# Patient Record
Sex: FEMALE
[De-identification: names, ages, dates, MRNs, and addresses within clinical notes are randomized per-mention and may not be internally consistent; named-entity substitution may affect disease eponyms.]

---

## 2017-06-13 ENCOUNTER — HOSPITAL (OUTPATIENT)
Dept: OTHER | Age: 72
End: 2017-06-13
Attending: INTERNAL MEDICINE

## 2017-06-13 LAB
ANALYZER ANC (IANC): ABNORMAL
ANION GAP SERPL CALC-SCNC: 12 MMOL/L (ref 10–20)
BASOPHILS # BLD: 0 THOUSAND/MCL (ref 0–0.3)
BASOPHILS NFR BLD: 0 %
BUN SERPL-MCNC: 21 MG/DL (ref 6–20)
BUN/CREAT SERPL: 20 (ref 7–25)
CALCIUM SERPL-MCNC: 9 MG/DL (ref 8.4–10.2)
CHLORIDE: 103 MMOL/L (ref 98–107)
CO2 SERPL-SCNC: 28 MMOL/L (ref 21–32)
CREAT SERPL-MCNC: 1.05 MG/DL (ref 0.51–0.95)
DIFFERENTIAL METHOD BLD: ABNORMAL
EOSINOPHIL # BLD: 0.2 THOUSAND/MCL (ref 0.1–0.5)
EOSINOPHIL NFR BLD: 2 %
ERYTHROCYTE [DISTWIDTH] IN BLOOD: 13.4 % (ref 11–15)
GLUCOSE SERPL-MCNC: 110 MG/DL (ref 65–99)
HEMATOCRIT: 35.8 % (ref 36–46.5)
HGB BLD-MCNC: 11.6 GM/DL (ref 12–15.5)
LYMPHOCYTES # BLD: 2.4 THOUSAND/MCL (ref 1–4)
LYMPHOCYTES NFR BLD: 28 %
MCH RBC QN AUTO: 29.4 PG (ref 26–34)
MCHC RBC AUTO-ENTMCNC: 32.4 GM/DL (ref 32–36.5)
MCV RBC AUTO: 90.9 FL (ref 78–100)
MONOCYTES # BLD: 0.7 THOUSAND/MCL (ref 0.3–0.9)
MONOCYTES NFR BLD: 8 %
NEUTROPHILS # BLD: 5.4 THOUSAND/MCL (ref 1.8–7.7)
NEUTROPHILS NFR BLD: 62 %
NEUTS SEG NFR BLD: ABNORMAL %
PERCENT NRBC: ABNORMAL
PLATELET # BLD: 346 THOUSAND/MCL (ref 140–450)
POTASSIUM SERPL-SCNC: 4 MMOL/L (ref 3.4–5.1)
RBC # BLD: 3.94 MILLION/MCL (ref 4–5.2)
SODIUM SERPL-SCNC: 139 MMOL/L (ref 135–145)
TROPONIN I SERPL HS-MCNC: <0.02 NG/ML
TROPONIN I SERPL HS-MCNC: <0.02 NG/ML
WBC # BLD: 8.7 THOUSAND/MCL (ref 4.2–11)

## 2017-06-14 ENCOUNTER — DIAGNOSTIC TRANS (OUTPATIENT)
Dept: OTHER | Age: 72
End: 2017-06-14

## 2017-06-14 LAB
ANALYZER ANC (IANC): ABNORMAL
ANION GAP SERPL CALC-SCNC: 9 MMOL/L (ref 10–20)
BASOPHILS # BLD: 0 THOUSAND/MCL (ref 0–0.3)
BASOPHILS NFR BLD: 1 %
BUN SERPL-MCNC: 20 MG/DL (ref 6–20)
BUN/CREAT SERPL: 22 (ref 7–25)
CALCIUM SERPL-MCNC: 8.4 MG/DL (ref 8.4–10.2)
CHLORIDE: 108 MMOL/L (ref 98–107)
CO2 SERPL-SCNC: 28 MMOL/L (ref 21–32)
CREAT SERPL-MCNC: 0.92 MG/DL (ref 0.51–0.95)
DIFFERENTIAL METHOD BLD: ABNORMAL
EOSINOPHIL # BLD: 0.2 THOUSAND/MCL (ref 0.1–0.5)
EOSINOPHIL NFR BLD: 4 %
ERYTHROCYTE [DISTWIDTH] IN BLOOD: 13.3 % (ref 11–15)
GLUCOSE SERPL-MCNC: 96 MG/DL (ref 65–99)
HEMATOCRIT: 33.5 % (ref 36–46.5)
HGB BLD-MCNC: 10.9 GM/DL (ref 12–15.5)
LYMPHOCYTES # BLD: 2 THOUSAND/MCL (ref 1–4)
LYMPHOCYTES NFR BLD: 35 %
MAGNESIUM SERPL-MCNC: 2.2 MG/DL (ref 1.7–2.4)
MCH RBC QN AUTO: 29.5 PG (ref 26–34)
MCHC RBC AUTO-ENTMCNC: 32.5 GM/DL (ref 32–36.5)
MCV RBC AUTO: 90.5 FL (ref 78–100)
MONOCYTES # BLD: 0.6 THOUSAND/MCL (ref 0.3–0.9)
MONOCYTES NFR BLD: 11 %
NEUTROPHILS # BLD: 2.8 THOUSAND/MCL (ref 1.8–7.7)
NEUTROPHILS NFR BLD: 49 %
NEUTS SEG NFR BLD: ABNORMAL %
PERCENT NRBC: ABNORMAL
PLATELET # BLD: 297 THOUSAND/MCL (ref 140–450)
POTASSIUM SERPL-SCNC: 4.1 MMOL/L (ref 3.4–5.1)
RBC # BLD: 3.7 MILLION/MCL (ref 4–5.2)
SODIUM SERPL-SCNC: 141 MMOL/L (ref 135–145)
TROPONIN I SERPL HS-MCNC: <0.02 NG/ML
WBC # BLD: 5.7 THOUSAND/MCL (ref 4.2–11)

## 2018-06-05 PROBLEM — M25.512 CHRONIC LEFT SHOULDER PAIN: Status: ACTIVE | Noted: 2018-06-05

## 2018-06-05 PROBLEM — G89.29 CHRONIC LEFT SHOULDER PAIN: Status: ACTIVE | Noted: 2018-06-05

## 2018-07-07 PROCEDURE — 82607 VITAMIN B-12: CPT | Performed by: INTERNAL MEDICINE

## 2018-07-16 PROCEDURE — 82746 ASSAY OF FOLIC ACID SERUM: CPT | Performed by: INTERNAL MEDICINE

## 2018-07-16 PROCEDURE — 82607 VITAMIN B-12: CPT | Performed by: INTERNAL MEDICINE

## 2018-08-28 PROBLEM — M54.17 L-S RADICULOPATHY: Status: ACTIVE | Noted: 2018-08-28

## 2018-09-24 PROCEDURE — 88305 TISSUE EXAM BY PATHOLOGIST: CPT | Performed by: INTERNAL MEDICINE

## 2018-10-03 ENCOUNTER — HOSPITAL (OUTPATIENT)
Dept: OTHER | Age: 73
End: 2018-10-03
Attending: HOSPITALIST

## 2018-10-03 ENCOUNTER — CHARTING TRANS (OUTPATIENT)
Dept: OTHER | Age: 73
End: 2018-10-03

## 2018-10-03 LAB
ANALYZER ANC (IANC): ABNORMAL
ANION GAP SERPL CALC-SCNC: 14 MMOL/L (ref 10–20)
BASOPHILS # BLD: 0 THOUSAND/MCL (ref 0–0.3)
BASOPHILS NFR BLD: 0 %
BUN SERPL-MCNC: 15 MG/DL (ref 6–20)
BUN/CREAT SERPL: 18 (ref 7–25)
CALCIUM SERPL-MCNC: 9.2 MG/DL (ref 8.4–10.2)
CHLORIDE: 104 MMOL/L (ref 98–107)
CO2 SERPL-SCNC: 29 MMOL/L (ref 21–32)
CREAT SERPL-MCNC: 0.85 MG/DL (ref 0.51–0.95)
DIFFERENTIAL METHOD BLD: ABNORMAL
EOSINOPHIL # BLD: 0 THOUSAND/MCL (ref 0.1–0.5)
EOSINOPHIL NFR BLD: 0 %
ERYTHROCYTE [DISTWIDTH] IN BLOOD: 15.2 % (ref 11–15)
ERYTHROCYTE [SEDIMENTATION RATE] IN BLOOD BY WESTERGREN METHOD: 38 MM/HR (ref 0–20)
GLUCOSE SERPL-MCNC: 119 MG/DL (ref 65–99)
HEMATOCRIT: 40.5 % (ref 36–46.5)
HGB BLD-MCNC: 13.5 GM/DL (ref 12–15.5)
LYMPHOCYTES # BLD: 1.4 THOUSAND/MCL (ref 1–4)
LYMPHOCYTES NFR BLD: 13 %
MAGNESIUM SERPL-MCNC: 2.1 MG/DL (ref 1.7–2.4)
MCH RBC QN AUTO: 30.5 PG (ref 26–34)
MCHC RBC AUTO-ENTMCNC: 33.3 GM/DL (ref 32–36.5)
MCV RBC AUTO: 91.4 FL (ref 78–100)
MONOCYTES # BLD: 1.4 THOUSAND/MCL (ref 0.3–0.9)
MONOCYTES NFR BLD: 13 %
NEUTROPHILS # BLD: 8 THOUSAND/MCL (ref 1.8–7.7)
NEUTROPHILS NFR BLD: 74 %
NEUTS SEG NFR BLD: ABNORMAL %
NRBC (NRBCRE): ABNORMAL
PLATELET # BLD: 293 THOUSAND/MCL (ref 140–450)
POTASSIUM SERPL-SCNC: 4.6 MMOL/L (ref 3.4–5.1)
RBC # BLD: 4.43 MILLION/MCL (ref 4–5.2)
SODIUM SERPL-SCNC: 142 MMOL/L (ref 135–145)
TROPONIN I SERPL HS-MCNC: <0.02 NG/ML
TROPONIN I SERPL HS-MCNC: <0.02 NG/ML
WBC # BLD: 10.8 THOUSAND/MCL (ref 4.2–11)

## 2018-10-04 ENCOUNTER — DIAGNOSTIC TRANS (OUTPATIENT)
Dept: OTHER | Age: 73
End: 2018-10-04

## 2018-10-04 LAB — TROPONIN I SERPL HS-MCNC: <0.02 NG/ML

## 2018-10-18 PROBLEM — I51.89 DIASTOLIC DYSFUNCTION: Status: ACTIVE | Noted: 2018-10-18

## 2018-10-18 PROBLEM — I30.9 ACUTE PERICARDITIS (HCC): Status: ACTIVE | Noted: 2018-10-18

## 2018-10-18 PROBLEM — IMO0002 CARDIAC LV EJECTION FRACTION >40%: Status: ACTIVE | Noted: 2018-10-18

## 2018-10-18 PROBLEM — R94.30 CARDIAC LV EJECTION FRACTION >40%: Status: ACTIVE | Noted: 2018-10-18

## 2018-10-18 PROBLEM — I30.9 ACUTE PERICARDITIS: Status: ACTIVE | Noted: 2018-10-18

## 2019-01-15 PROBLEM — R73.03 PREDIABETES: Status: ACTIVE | Noted: 2019-01-15

## 2019-01-18 PROCEDURE — 86803 HEPATITIS C AB TEST: CPT | Performed by: INTERNAL MEDICINE

## 2020-07-24 PROBLEM — Z00.00 ROUTINE GENERAL MEDICAL EXAMINATION AT A HEALTH CARE FACILITY: Status: ACTIVE | Noted: 2020-07-24

## 2021-04-12 DIAGNOSIS — Z23 NEED FOR VACCINATION: ICD-10-CM

## 2021-07-01 PROBLEM — M79.10 MYALGIA: Status: ACTIVE | Noted: 2021-07-01

## 2021-07-01 PROBLEM — M25.50 ARTHRALGIA: Status: ACTIVE | Noted: 2021-07-01

## 2023-07-13 ENCOUNTER — HOSPITAL ENCOUNTER (EMERGENCY)
Age: 78
Discharge: LEFT BEFORE DISCHARGE | End: 2023-07-13

## 2023-07-13 VITALS
RESPIRATION RATE: 16 BRPM | TEMPERATURE: 98.2 F | HEART RATE: 80 BPM | DIASTOLIC BLOOD PRESSURE: 71 MMHG | OXYGEN SATURATION: 98 % | SYSTOLIC BLOOD PRESSURE: 152 MMHG | HEIGHT: 60 IN

## 2023-07-13 ASSESSMENT — PAIN SCALES - GENERAL: PAINLEVEL_OUTOF10: 9

## 2025-05-12 ENCOUNTER — APPOINTMENT (OUTPATIENT)
Dept: CT IMAGING | Facility: HOSPITAL | Age: 80
End: 2025-05-12
Attending: EMERGENCY MEDICINE
Payer: MEDICARE

## 2025-05-12 ENCOUNTER — HOSPITAL ENCOUNTER (EMERGENCY)
Facility: HOSPITAL | Age: 80
Discharge: HOME | End: 2025-05-12
Attending: EMERGENCY MEDICINE
Payer: MEDICARE

## 2025-05-12 ENCOUNTER — APPOINTMENT (OUTPATIENT)
Dept: GENERAL RADIOLOGY | Facility: HOSPITAL | Age: 80
End: 2025-05-12
Attending: EMERGENCY MEDICINE
Payer: MEDICARE

## 2025-05-12 ENCOUNTER — APPOINTMENT (OUTPATIENT)
Dept: MRI IMAGING | Facility: HOSPITAL | Age: 80
End: 2025-05-12
Attending: EMERGENCY MEDICINE
Payer: MEDICARE

## 2025-05-12 VITALS
OXYGEN SATURATION: 100 % | RESPIRATION RATE: 18 BRPM | HEART RATE: 70 BPM | BODY MASS INDEX: 28 KG/M2 | DIASTOLIC BLOOD PRESSURE: 68 MMHG | SYSTOLIC BLOOD PRESSURE: 130 MMHG | WEIGHT: 151 LBS | TEMPERATURE: 98 F

## 2025-05-12 DIAGNOSIS — I74.9 TIA DUE TO EMBOLISM (HCC): Primary | ICD-10-CM

## 2025-05-12 DIAGNOSIS — G45.9 TIA DUE TO EMBOLISM (HCC): Primary | ICD-10-CM

## 2025-05-12 LAB
ALBUMIN SERPL-MCNC: 3.7 G/DL (ref 3.2–4.8)
ALBUMIN/GLOB SERPL: 1.6 {RATIO} (ref 1–2)
ALP LIVER SERPL-CCNC: 69 U/L
ALT SERPL-CCNC: 22 U/L
ANION GAP SERPL CALC-SCNC: 6 MMOL/L (ref 0–18)
APTT PPP: 25.2 SECONDS (ref 23–36)
AST SERPL-CCNC: 25 U/L (ref ?–34)
ATRIAL RATE: 89 BPM
BASOPHILS # BLD AUTO: 0.06 X10(3) UL (ref 0–0.2)
BASOPHILS NFR BLD AUTO: 0.6 %
BILIRUB SERPL-MCNC: 0.5 MG/DL (ref 0.2–1.1)
BUN BLD-MCNC: 18 MG/DL (ref 9–23)
CALCIUM BLD-MCNC: 8.6 MG/DL (ref 8.7–10.6)
CHLORIDE SERPL-SCNC: 106 MMOL/L (ref 98–112)
CO2 SERPL-SCNC: 25 MMOL/L (ref 21–32)
CREAT BLD-MCNC: 1.07 MG/DL
EGFRCR SERPLBLD CKD-EPI 2021: 53 ML/MIN/1.73M2 (ref 60–?)
EOSINOPHIL # BLD AUTO: 0.09 X10(3) UL (ref 0–0.7)
EOSINOPHIL NFR BLD AUTO: 1 %
ERYTHROCYTE [DISTWIDTH] IN BLOOD BY AUTOMATED COUNT: 13.2 %
GLOBULIN PLAS-MCNC: 2.3 G/DL (ref 2–3.5)
GLUCOSE BLD-MCNC: 105 MG/DL (ref 70–99)
GLUCOSE BLD-MCNC: 115 MG/DL (ref 70–99)
GLUCOSE BLD-MCNC: 76 MG/DL (ref 70–99)
HCT VFR BLD AUTO: 32.2 %
HGB BLD-MCNC: 10.9 G/DL (ref 7–20)
IMM GRANULOCYTES # BLD AUTO: 0.08 X10(3) UL (ref 0–1)
IMM GRANULOCYTES NFR BLD: 0.9 %
INR BLD: 1.08 (ref 0.8–1.2)
LYMPHOCYTES # BLD AUTO: 1.19 X10(3) UL (ref 1–4)
LYMPHOCYTES NFR BLD AUTO: 12.8 %
MCH RBC QN AUTO: 31.8 PG (ref 26–34)
MCHC RBC AUTO-ENTMCNC: 33.9 G/DL (ref 31–37)
MCV RBC AUTO: 93.9 FL
MONOCYTES # BLD AUTO: 0.84 X10(3) UL (ref 0.1–1)
MONOCYTES NFR BLD AUTO: 9.1 %
NEUTROPHILS # BLD AUTO: 7.01 X10 (3) UL (ref 1.5–7.7)
NEUTROPHILS # BLD AUTO: 7.01 X10(3) UL (ref 1.5–7.7)
NEUTROPHILS NFR BLD AUTO: 75.6 %
OSMOLALITY SERPL CALC.SUM OF ELEC: 287 MOSM/KG (ref 275–295)
P AXIS: 64 DEGREES
P-R INTERVAL: 152 MS
PLATELET # BLD AUTO: 267 10(3)UL (ref 150–450)
POTASSIUM SERPL-SCNC: 4.1 MMOL/L (ref 3.5–5.1)
PROT SERPL-MCNC: 6 G/DL (ref 5.7–8.2)
PROTHROMBIN TIME: 14.1 SECONDS (ref 11.6–14.8)
Q-T INTERVAL: 352 MS
QRS DURATION: 72 MS
QTC CALCULATION (BEZET): 428 MS
R AXIS: 19 DEGREES
RBC # BLD AUTO: 3.43 X10(6)UL
SODIUM SERPL-SCNC: 137 MMOL/L (ref 136–145)
T AXIS: 44 DEGREES
TROPONIN I SERPL HS-MCNC: <3 NG/L (ref ?–45)
VENTRICULAR RATE: 89 BPM
WBC # BLD AUTO: 9.3 X10(3) UL (ref 4–11)

## 2025-05-12 PROCEDURE — 80053 COMPREHEN METABOLIC PANEL: CPT | Performed by: EMERGENCY MEDICINE

## 2025-05-12 PROCEDURE — 85610 PROTHROMBIN TIME: CPT | Performed by: EMERGENCY MEDICINE

## 2025-05-12 PROCEDURE — 99285 EMERGENCY DEPT VISIT HI MDM: CPT

## 2025-05-12 PROCEDURE — 85025 COMPLETE CBC W/AUTO DIFF WBC: CPT | Performed by: EMERGENCY MEDICINE

## 2025-05-12 PROCEDURE — 70551 MRI BRAIN STEM W/O DYE: CPT | Performed by: EMERGENCY MEDICINE

## 2025-05-12 PROCEDURE — 70498 CT ANGIOGRAPHY NECK: CPT | Performed by: EMERGENCY MEDICINE

## 2025-05-12 PROCEDURE — 85730 THROMBOPLASTIN TIME PARTIAL: CPT | Performed by: EMERGENCY MEDICINE

## 2025-05-12 PROCEDURE — 71045 X-RAY EXAM CHEST 1 VIEW: CPT | Performed by: EMERGENCY MEDICINE

## 2025-05-12 PROCEDURE — 93010 ELECTROCARDIOGRAM REPORT: CPT

## 2025-05-12 PROCEDURE — 93005 ELECTROCARDIOGRAM TRACING: CPT

## 2025-05-12 PROCEDURE — 70450 CT HEAD/BRAIN W/O DYE: CPT | Performed by: EMERGENCY MEDICINE

## 2025-05-12 PROCEDURE — 36415 COLL VENOUS BLD VENIPUNCTURE: CPT

## 2025-05-12 PROCEDURE — 84484 ASSAY OF TROPONIN QUANT: CPT | Performed by: EMERGENCY MEDICINE

## 2025-05-12 PROCEDURE — 70496 CT ANGIOGRAPHY HEAD: CPT | Performed by: EMERGENCY MEDICINE

## 2025-05-12 PROCEDURE — 82962 GLUCOSE BLOOD TEST: CPT

## 2025-05-12 RX ORDER — CLOPIDOGREL BISULFATE 75 MG/1
75 TABLET ORAL DAILY
Qty: 30 TABLET | Refills: 0 | Status: SHIPPED | OUTPATIENT
Start: 2025-05-12 | End: 2025-06-11

## 2025-05-12 RX ORDER — ROSUVASTATIN CALCIUM 5 MG/1
5 TABLET, COATED ORAL
COMMUNITY
End: 2025-05-12

## 2025-05-12 RX ORDER — ROSUVASTATIN CALCIUM 20 MG/1
20 TABLET, COATED ORAL NIGHTLY
Qty: 30 TABLET | Refills: 0 | Status: SHIPPED | OUTPATIENT
Start: 2025-05-12 | End: 2025-06-11

## 2025-05-12 NOTE — ED INITIAL ASSESSMENT (HPI)
PT arrives via EMS as a [place] Alert. EMS reports that the PT began to present sudden slurred speech and aphasia while talking to her  at approximately 1310 hours. PT states that she feels \"lot better\", no slurred speech or aphasia noted during initial assessment

## 2025-05-12 NOTE — ED PROVIDER NOTES
Patient Seen in: [facility] Emergency Department      History     Chief Complaint   Patient presents with    Stroke     Stated Complaint: Stroke    Subjective:   HPI    79-year-old female with a past medical history as below including hyperlipidemia, CHF, prediabetes, CKD, aortic atherosclerosis, anemia brought by EMS for slurred speech.  Per EMS report, patient was with her  when he noted a sudden change in her speech about 20 minutes prior to arrival.  Patient states she feels like her speech is almost back to normal now.  Patient states she knew what she was trying to say but could not get the words out.  She reports minimal headache.  Denies visual changes.  Denies numbness or weakness of her extremities.    History of Present Illness               Objective:     Past Medical History:    Coronary artery calcification    CT  [facility]    GERD    History of repair of hiatal hernia    2025    HYPERLIPIDEMIA    OTHER DISEASES    IFG with normal GTT     OTHER DISEASES    Lipoma L shoulder    Prediabetes    Thyroid nodule    Ulises managing and fu               Past Surgical History:   Procedure Laterality Date    Capsule endoscopy - internal referral  = Normal    Cataract      Colonoscopy  10/06= Diverticulosis, Polyps    Colonoscopy  12= Diverticulosis    Repeat 5 years    Colonoscopy  2017    Diverticulosis.  Repeat     Colonoscopy N/A 2017    Procedure: COLONOSCOPY, POSSIBLE BIOPSY, POSSIBLE POLYPECTOMY 41617;  Surgeon: Robbie Sykes MD;  Location: [facility]    Colonoscopy,diagnostic  2012    Procedure: COLONOSCOPY, POSSIBLE BIOPSY, POSSIBLE POLYPECTOMY 94643;  Surgeon: Robbie Sykes MD;  Location: [facility]              Other surgical history      thyroid aspiration     Girgus    Other surgical history      breast biopsy    Other surgical history      sinus surg    Upper gi endoscopy performed  10/06= Hiatal hernia     Upper gi endoscopy,biopsy  9/24/18= Hiatal hernia.  SB Bx normal                Social History     Socioeconomic History    Marital status:    Tobacco Use    Smoking status: Never    Smokeless tobacco: Never   Substance and Sexual Activity    Alcohol use: No     Comment: rare    Drug use: Never     Social Drivers of Health     Food Insecurity: Low Risk  (3/24/2025)    Received from [facility]    Food Insecurity     Have there been times that your food ran out, and you didn't have money to get more?: No     Are there times that you worry that this might happen?: No   Transportation Needs: Low Risk  (3/24/2025)    Received from [facility]    Transportation Needs     Do you have trouble getting transportation to medical appointments?: No   Housing Stability: Low Risk  (3/24/2025)    Received from [facility]    Housing Stability     Are you worried that your electric, gas, oil, or water might be shut off?: No     Are you concerned about having a safe and reliable place to live?: No                                Physical Exam     ED Triage Vitals [05/12/25 1343]   /76   Pulse 79   Resp 18   Temp 97.6 °F (36.4 °C)   Temp src Temporal   SpO2 100 %   O2 Device        Current Vitals:   Vital Signs  BP: 133/61  Pulse: 72  Resp: 24  Temp: 97.6 °F (36.4 °C)  Temp src: Temporal  MAP (mmHg): 83    Oxygen Therapy  SpO2: 100 %        Physical Exam  Vitals and nursing note reviewed.   Constitutional:       Appearance: Normal appearance. She is normal weight.   HENT:      Head: Normocephalic and atraumatic.      Nose: Nose normal.      Mouth/Throat:      Mouth: Mucous membranes are moist.   Eyes:      Conjunctiva/sclera: Conjunctivae normal.   Cardiovascular:      Rate and Rhythm: Normal rate and regular rhythm.   Pulmonary:      Effort: Pulmonary effort is normal.      Breath sounds: Normal breath sounds.   Abdominal:      Palpations: Abdomen is soft.       Tenderness: There is no abdominal tenderness.   Skin:     General: Skin is warm and dry.   Neurological:      General: No focal deficit present.      Mental Status: She is alert and oriented to person, place, and time.      Cranial Nerves: No cranial nerve deficit, dysarthria or facial asymmetry.      Sensory: No sensory deficit.      Motor: No weakness.      Coordination: Coordination normal.   Psychiatric:         Mood and Affect: Mood normal.         Behavior: Behavior normal.           Physical Exam                ED Course     Labs Reviewed   COMP METABOLIC PANEL (14) - Abnormal; Notable for the following components:       Result Value    Glucose 115 (*)     Calcium, Total 8.6 (*)     eGFR-Cr 53 (*)     All other components within normal limits   POCT GLUCOSE - Abnormal; Notable for the following components:    POC Glucose 105 (*)     All other components within normal limits   PROTHROMBIN TIME (PT) - Normal   PTT, ACTIVATED - Normal   TROPONIN I HIGH SENSITIVITY - Normal   POCT GLUCOSE - Normal   CBC WITH DIFFERENTIAL WITH PLATELET   RAINBOW DRAW LAVENDER   RAINBOW DRAW LIGHT GREEN   RAINBOW DRAW BLUE   RAINBOW DRAW GOLD     EKG    Rate, intervals and axes as noted on EKG Report.  Rate: 89  Rhythm: Sinus Rhythm  Reading: Sinus rhythm with occasional PAC, poor progression anteriorly, no ST/T wave changes              Results                         MRI BRAIN (NJM=16416)  Result Date: 5/12/2025  CONCLUSION:  Chronic brain changes, not uncommon for age.  No restricted diffusion or other features to indicate acute infarct.   LOCATION:  [facility]   Dictated by (CST): Floyd Welch MD on 5/12/2025 at 4:57 PM     Finalized by (CST): Floyd Welch MD on 5/12/2025 at 4:59 PM       XR CHEST AP PORTABLE  (CPT=71045)  Result Date: 5/12/2025  CONCLUSION:  No evidence of active cardiopulmonary disease.   LOCATION:  [facility]      Dictated by (CST): Edgar Younger MD on 5/12/2025 at 2:44 PM     Finalized by (CST): Aren  MD Edgar on 5/12/2025 at 2:45 PM       CT STROKE CTA BRAIN/CTA NECK (W IV)(CPT=70496/12846)  Result Date: 5/12/2025  CONCLUSION:  No sign of flow limiting or otherwise hemodynamically significant stenosis.   LOCATION:  [facility]   Dictated by (CST): Floyd Welch MD on 5/12/2025 at 2:22 PM     Finalized by (CST): Floyd Welch MD on 5/12/2025 at 2:25 PM       CT STROKE BRAIN (NO IV)(CPT=70450)  Result Date: 5/12/2025  CONCLUSION:  Nonspecific small focal densities within the MCA bilaterally, not branching or curvilinear.  Chronic changes in the brain, as described, but no acute intracranial bleed, or other acute intracranial process identified.   LOCATION:  [facility]   Dictated by (CST): Floyd Welch MD on 5/12/2025 at 2:08 PM     Finalized by (CST): Floyd Welch MD on 5/12/2025 at 2:12 PM         A total of 42 minutes of critical care time (exclusive of billable procedures) was administered to manage the patient's aphasianeurologic instability due to his TIA.  This involved direct patient intervention, complex decision making, and/or extensive discussions with the patient, family, and clinical staff.    MDM      79-year-old female with a past medical history as below including hyperlipidemia, CHF, prediabetes, CKD, aortic atherosclerosis, anemia brought by EMS for slurred speech.      Differential includes but is not limited to CVA, TIA, ICH    Stroke alert was called prior to patient's arrival she was evaluated at stroke launch pad.  Patient speech was clear with NIHSS 0.    Independent interpretation of CT brain shows no evidence of bleed or stroke.  Discussed with radiologist who states CTA showed no acute findings or large vessel occlusion.    Discussed with stroke neurology Dr. Evans, not tPA due to resolved symptoms.  Discussed with neurology Dr. Pinzon who recommends MRI of the brain.  He states if MRI is negative, patient may be discharged home with addition of Plavix 75 mg increase in dose of  pravastatin to 40 mg daily with follow-up in the TIA clinic.  Patient is comfortable with this plan.    5:42 PM  MRI results reviewed as above noting no restricted diffusion or other features indicate acute infarct.  Med rec shows patient was not taking pravastatin as was on initial med list.  Patient's pharmacy was contacted and patient is currently taking rosuvastatin 5 mg every other day.  Increase to 20 mg daily along with Plavix.  Advised to follow-up with TIA clinic and PCP.  Return precautions discussed.      Medical Decision Making  Amount and/or Complexity of Data Reviewed  Labs: ordered. Decision-making details documented in ED Course.  Radiology: ordered and independent interpretation performed. Decision-making details documented in ED Course.  ECG/medicine tests: ordered and independent interpretation performed. Decision-making details documented in ED Course.  Discussion of management or test interpretation with external provider(s): Stroke neurology, general neurology, radiology    Risk  Prescription drug management.  Decision regarding hospitalization.        Disposition and Plan     Clinical Impression:  1. TIA due to embolism (HCC)         Disposition:  Discharge  5/12/2025  5:44 pm    Follow-up:  [address]  [address] [address] [phone]  Call  choose option 1 for general neurology and state that you are following up for TIA    Jam Lynn MD  [address] [facility] [address]  [facility] [address] [phone]    Schedule an appointment as soon as possible for a visit in 3 day(s)            Medications Prescribed:  Current Discharge Medication List        START taking these medications    Details   clopidogrel 75 MG Oral Tab Take 1 tablet (75 mg total) by mouth daily.  Qty: 30 tablet, Refills: 0      rosuvastatin 20 MG Oral Tab Take 1 tablet (20 mg total) by mouth nightly.  Qty: 30 tablet, Refills: 0              Supplementary Documentation:            TNK/ NI Documentation:    Date/Time last known well:   N/A    NIHSS on presentation: N/A     Chief Complaint   Patient presents with    Stroke     IV Tenecteplase (TNK) administered: No; Patient is not a Candidate for IV TNK due to: Mild nondisabling symptoms or rapidly improving symptoms    Candidate for Endovascular thrombectomy (EVT): No; Patient is not a candidate for Endovascular Thrombectomy due to: No large vessel occlusion ( LVO)  on CTA/MRA imaging      Disposition: There is no disposition on file for this visit.

## 2025-05-12 NOTE — SIGNIFICANT EVENT
Stroke Alert initiated ED  Last Know Normal at 1300  Pre-morbid MRS 0  Initial NIHSS 0       Patient accompanied to CT dept  Repeat NIHSS completed back in ED room    NIH Stroke Scale  1a.  Level of consciousness: 0   1b. LOC questions:  0   1c. LOC commands: 0   2.  Best Gaze: 0   3. Visual: 0   4. Facial Palsy: 0   5a. Motor left arm: 0   5b.  Motor right arm: 0   6a. Motor left le   6b.  Motor right le   7. Limb Ataxia: 0   8.  Sensory: 0   9. Best Language:  0   10. Dysarthria: 0   11. Extinction and Inattention: 0     Total:   0         Per Dr Evans, patient is not a candidate for TNK, exclusions include resolution of symptoms.     Case discussed with Dr Ortiz, ED      Of note: Per patient, states pt previously with slurred speech, and some expressive aphasia.  Upon arrival to ED, no longer symptomatic.       Please refer to the Stroke Data Flowsheets for additional information and Stroke Alert response times.

## 2025-05-12 NOTE — ED QUICK NOTES
Introduced myself to patient and 2 family members in room. Patient doc completed for MRI and pt in line.

## 2025-05-12 NOTE — ED QUICK NOTES
Patient to bathroom before MRI. Patient sts has small headache in her left eye. Patient does not want any medication prior to MRI for headache. RN notified.

## 2025-05-13 ENCOUNTER — TELEPHONE (OUTPATIENT)
Dept: NEUROLOGY | Facility: CLINIC | Age: 80
End: 2025-05-13

## 2025-05-13 NOTE — TELEPHONE ENCOUNTER
Patient is calling to schedule TIA appointment. Please advice if TIA appointment can be scheduled.

## 2025-05-13 NOTE — TELEPHONE ENCOUNTER
Spoke with patient and appointment scheduled with Dr Espino  for tomorrow, 5/14 , at 11:00 in [place].

## 2025-05-13 NOTE — TELEPHONE ENCOUNTER
TIA CLINIC SCREENING    Situation  Date of ED visit/TIA diagnosis: 5/12/2025    Time of discharge from ED: 1801    Is patient currently admitted?  No If YES - TIA Clinic Appointment not required.    Delete Background and Assessment sections and skip to Recommendation.     Background  Does patient already see an [facility] neurologist? No  Name:  If YES - TIA Clinic Appointment not required.    Route completed message on to patient's neurologist for follow up recommendation.       Assessment  Patient's current anti-platelet therapy: Plavix   Patient's current statin therapy: Rosuvastatin   Has 2D Echo with bubble test been done? No  Date:      Recommendation  Is TIA Clinic Appointment indicated?  Unsure - routing encounter to Stroke Director for recommendation   If YES Contact patient to schedule appointment NO LATER THAN 48 HOURS AFTER ED DISCHARGE.    If message left for patient, document message and route encounter to [facility] to follow up.  If patient declines appointment within 24-48 hours, document that and find an appointment suitable to patient's schedule.  If patient declines appointment, document that and reason for decline.   If UNSURE  Route encounter to clinic provider for recommendation.   If NO  indicate reason and sign encounter.       TIA APPOINTMENT STATUS: Routing to provider for further recommendation.

## 2025-05-14 ENCOUNTER — OFFICE VISIT (OUTPATIENT)
Dept: NEUROLOGY | Facility: CLINIC | Age: 80
End: 2025-05-14
Payer: MEDICARE

## 2025-05-14 VITALS
WEIGHT: 147 LBS | DIASTOLIC BLOOD PRESSURE: 60 MMHG | OXYGEN SATURATION: 100 % | HEIGHT: 60 IN | BODY MASS INDEX: 28.86 KG/M2 | HEART RATE: 69 BPM | RESPIRATION RATE: 16 BRPM | SYSTOLIC BLOOD PRESSURE: 110 MMHG

## 2025-05-14 DIAGNOSIS — G45.9 TIA (TRANSIENT ISCHEMIC ATTACK): Primary | ICD-10-CM

## 2025-05-14 PROCEDURE — 99204 OFFICE O/P NEW MOD 45 MIN: CPT | Performed by: OTHER

## 2025-05-14 NOTE — PROGRESS NOTES
HPI:    Patient ID: Lindsey Perez is a 79 year old female.    HPI  Hilda Hidalgo is a 79 right-handed female with history of hyperlipidemia who presents for evaluation of TIA.  Patient came to [facility] on 5/12 with an episode of aphasia.  Reports that that in the morning she went out for a walk ( she is part of \"walk and talk\" group)  States around the noon she was doing her laundry and felt that something was not right that she was not able to fold the clothes and when she tried to tell her  she had trouble speaking, speech was garbled, lasted for 15-20 minutes  Symptoms resolved completely by the time she came to the ED. had no further recurrence  Patient had a CT head/CTA head and neck performed which was negative for any large vessel occlusion stenosis.  MRI of the brain obtained was negative for any acute infarction.  Patient was discharged on antiplatelet agent.  Patient is not on any aspirin at home.  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    HENT: Negative.     Eyes: Negative.    Respiratory: Negative.     Cardiovascular: Negative.    Gastrointestinal: Negative.    Endocrine: Negative.    Genitourinary: Negative.    Musculoskeletal: Negative.    Skin: Negative.    Allergic/Immunologic: Negative.    Neurological:  Positive for speech difficulty.   Hematological: Negative.    Psychiatric/Behavioral: Negative.     All other systems reviewed and are negative.         Current Medications[5]  Allergies:Allergies[6]  PHYSICAL EXAM:   Physical Exam    Blood pressure 110/60, pulse 69, resp. rate 16, height 60\", weight 147 lb (66.7 kg), SpO2 100%, not currently breastfeeding.  General Appearance: Well nourished, well developed, no apparent distress.     HEENT: Normocephalic and atraumatic.    Cardiovascular: Normal rate, regular rhythm and normal heart sounds.    Pulmonary/Chest: Effort normal and breath sounds normal.   Abdominal: Soft. Bowel sounds are normal.   Skin: dry, clean and intact  Ext: peripheral pulses present  Psych: normal mood and affect    Neurological:  Patient is awake, alert and oriented to person, place and time   Normal memory, attention/concentration, speech and language.    Cranial Nerves: II: Visual acuity: normal  II: Visual fields: normal  III: Pupils: equal, round, reactive to light  III,IV,VI: Extra Ocular Movements: intact  V: Facial sensation: intact  VII: Facial strength: intact  VIII: Hearing: intact  IX: Palate: intact  XI: Shoulder shrug: intact  XII: Tongue movement: normal    Motor: Normal tone. Strength is  5 out of 5 in all extremities bilaterally.  DTR: present and 2+ throughout    Sensory: Sensory examination is normal to light touch and pinprick     Coordination: Finger-to-nose, heel-to-shin, and rapid alternating movements are normal bilaterally without evidence of dysmetria.    Gait: Casual and tandem gait are normal.       TESTS/IMAGING:       MRI brain: 5/12/2025  FINDINGS:    There is some motion artifact.  No sign of restricted diffusion or other features for acute infarct.  No midline shift mass effect herniation hydrocephalus.  Atrophy and chronic ischemic white matter changes, not uncommon for age.  No midline shift.  No  herniation hydrocephalus.  No pathologic gradient susceptibility. Flow-voids are present within the right and left internal carotid arteries, the basilar artery, and right and left distal vertebral arteries  Postinflammatory changes are present within  the paranasal sinuses, without findings indicating acute sinusitis.                   Impression   CONCLUSION:  Chronic brain changes, not uncommon for age.  No restricted diffusion or other features to indicate acute infarct.       ASSESSMENT/PLAN:       ICD-10-CM    1. TIA (transient  ischemic attack)  G45.9         Patient is a pleasant 79-year-old female with history of hyperlipidemia and palpitation who presented for evaluation of an episode of transient aphasia suggestive of a TIA  CTA head and neck obtained was negative for any carotid stenosis or large vessel occlusion  MRI brain negative for acute infarction    Etiology unclear, rule out cardiac source      ABCD 2- 3 points  Per the validation study, 0-3 points: Low Risk  2-Day Stroke Risk: 1.0%  7-Day Stroke Risk: 1.2%  90-Day Stroke Risk: 3.1%      Continue Aspirin monotherapy 325 mg daily. No indication for Clopidogrel at this time  Follow with Dr Tesfaye for repeat ECHO doppler and [employer] event monitor to assess for Afib  Consider using Apple watch with EKG capability       Marc Espino MD  [facility] [place]    This note was prepared using Dragon Medical voice recognition dictation software. As a result errors may occur. When identified these errors have been corrected. While every attempt is made to correct errors during dictation discrepancies may still exist         Meds This Visit:  Requested Prescriptions      No prescriptions requested or ordered in this encounter       Imaging & Referrals:  None     ID#[id number]         [1]   Past Medical History:   Coronary artery calcification    CT 6/17 [facility]    GERD    History of repair of hiatal hernia    23 April 2025    HYPERLIPIDEMIA    OTHER DISEASES    IFG with normal GTT 12/09    OTHER DISEASES    Lipoma L shoulder    Prediabetes    Thyroid nodule    Ulises managing and fu    [2]   Past Surgical History:  Procedure Laterality Date    Capsule endoscopy - internal referral  8/20= Normal    Cataract      Colonoscopy  10/06= Diverticulosis, Polyps    Colonoscopy  4/2/12= Diverticulosis    Repeat 5 years    Colonoscopy  07/19/2017    Diverticulosis.  Repeat 2022    Colonoscopy N/A 7/19/2017    Procedure: COLONOSCOPY, POSSIBLE BIOPSY, POSSIBLE POLYPECTOMY 27544;  Surgeon: Robbie Sykes  MD SHAWN;  Location: [facility]    Colonoscopy,diagnostic  2012    Procedure: COLONOSCOPY, POSSIBLE BIOPSY, POSSIBLE POLYPECTOMY 78630;  Surgeon: Robbie Sykes MD;  Location: [facility]              Other surgical history      thyroid aspiration     Girgus    Other surgical history      breast biopsy    Other surgical history      sinus surg    Upper gi endoscopy performed  10/06= Hiatal hernia    Upper gi endoscopy,biopsy  18= Hiatal hernia.  SB Bx normal   [3]   Family History  Adopted: Yes   [4]   Social History  Socioeconomic History    Marital status:    Tobacco Use    Smoking status: Never    Smokeless tobacco: Never   Vaping Use    Vaping status: Never Used   Substance and Sexual Activity    Alcohol use: No     Comment: rare    Drug use: Never   Other Topics Concern    Caffeine Concern Yes     Comment: Decaf Coffee/Tea    Exercise Yes     Comment: 3 times a week walking for a 1 hour and rk     Social Drivers of Health     Food Insecurity: Low Risk  (3/24/2025)    Received from [facility]    Food Insecurity     Have there been times that your food ran out, and you didn't have money to get more?: No     Are there times that you worry that this might happen?: No   Transportation Needs: Low Risk  (3/24/2025)    Received from [facility]    Transportation Needs     Do you have trouble getting transportation to medical appointments?: No   Housing Stability: Low Risk  (3/24/2025)    Received from [facility]    Housing Stability     Are you worried that your electric, gas, oil, or water might be shut off?: No     Are you concerned about having a safe and reliable place to live?: No   [5]   Current Outpatient Medications   Medication Sig Dispense Refill    clopidogrel 75 MG Oral Tab Take 1 tablet (75 mg total) by mouth daily. 30 tablet 0    rosuvastatin 20 MG Oral Tab Take 1 tablet (20 mg total)  by mouth nightly. 30 tablet 0    Multiple Vitamins-Minerals (EYE VITAMINS & MINERALS OR) Take by mouth.      Fluticasone Propionate 50 MCG/ACT Nasal Suspension SPRAY 2 SPRAYS IN EACH NOSTRIL EVERY DAY 16 g 3    Probiotic Product (PROBIOTIC OR) Take  by mouth.      CALCIUM + D OR 2 daily      VITAMIN E 1 time daily      CALCIUM OR Take by mouth As Directed.      PANTOPRAZOLE 40 MG Oral Tab EC Take 1 tablet by mouth daily. (Patient not taking: Reported on 5/14/2025) 90 tablet 0    Zinc 25 MG Oral Tab Take by mouth. (Patient not taking: Reported on 5/14/2025)      Ferrous Sulfate 325 (65 Fe) MG Oral Tab Take 1 tablet (325 mg total) by mouth daily with breakfast. (Patient not taking: Reported on 5/14/2025) 30 tablet 3    BABY ASPIRIN OR Take by mouth. (Patient not taking: Reported on 5/14/2025)      Misc Natural Products (GLUCOSAMINE CHONDROITIN ADV OR) Take  by mouth. (Patient not taking: Reported on 5/12/2025)     [6]   Allergies  Allergen Reactions    Levaquin SWELLING

## 2025-05-14 NOTE — PATIENT INSTRUCTIONS
Take Aspirin full dose 325 mg daily    2. Continue Rosuvastatin 10 mg daily     3. Cardiology evaluation: ECHO doppler and Zio event monitor                   Refill policies:    Allow 2-3 business days for refills; controlled substances may take longer.  Contact your pharmacy at least 5 days prior to running out of medication and have them send an electronic request or submit request through the “request refill” option in your [employer] account.  Refills are not addressed on weekends; covering physicians do not authorize routine medications on weekends.  No narcotics or controlled substances are refilled after noon on Fridays or by on call physicians.  By law, narcotics must be electronically prescribed.  A 30 day supply with no refills is the maximum allowed.  If your prescription is due for a refill, you may be due for a follow up appointment.  To best provide you care, patients receiving routine medications need to be seen at least once a year.  Patients receiving narcotic/controlled substance medications need to be seen at least once every 3 months.  In the event that your preferred pharmacy does not have the requested medication in stock (e.g. Backordered), it is your responsibility to find another pharmacy that has the requested medication available.  We will gladly send a new prescription to that pharmacy at your request.    Scheduling Tests:    If your physician has ordered radiology tests such as MRI or CT scans, please contact Central Scheduling at [phone] right away to schedule the test.  Once scheduled, the [facility] Centralized Referral Team will work with your insurance carrier to obtain pre-certification or prior authorization.  Depending on your insurance carrier, approval may take 3-10 days.  It is highly recommended patients assure they have received an authorization before having a test performed.  If test is done without insurance authorization, patient may be responsible for the entire  amount billed.      Precertification and Prior Authorizations:  If your physician has recommended that you have a procedure or additional testing performed the [facility] Centralized Referral Team will contact your insurance carrier to obtain pre-certification or prior authorization.    You are strongly encouraged to contact your insurance carrier to verify that your procedure/test has been approved and is a COVERED benefit.  Although the [facility] Centralized Referral Team does its due diligence, the insurance carrier gives the disclaimer that \"Although the procedure is authorized, this does not guarantee payment.\"    Ultimately the patient is responsible for payment.   Thank you for your understanding in this matter.  Paperwork Completion:  If you require FMLA or disability paperwork for your recovery, please make sure to either drop it off or have it faxed to our office at [phone]. Be sure the form has your name and date of birth on it.  The form will be faxed to our Forms Department and they will complete it for you.  There is a 25$ fee for all forms that need to be filled out.  Please be aware there is a 10-14 day turnaround time.  You will need to sign a release of information (KATRINA) form if your paperwork does not come with one.  You may call the Forms Department with any questions at [phone].  Their fax number is [phone].

## 2025-08-01 ENCOUNTER — APPOINTMENT (OUTPATIENT)
Dept: ADMINISTRATIVE | Facility: HOSPITAL | Age: 80
End: 2025-08-01

## 2025-08-13 ENCOUNTER — ANESTHESIA EVENT (OUTPATIENT)
Dept: ENDOSCOPY | Facility: HOSPITAL | Age: 80
End: 2025-08-13

## 2025-08-14 ENCOUNTER — HOSPITAL ENCOUNTER (OUTPATIENT)
Facility: HOSPITAL | Age: 80
Setting detail: HOSPITAL OUTPATIENT SURGERY
Discharge: HOME | End: 2025-08-14
Attending: INTERNAL MEDICINE | Admitting: INTERNAL MEDICINE

## 2025-08-14 ENCOUNTER — ANESTHESIA (OUTPATIENT)
Dept: ENDOSCOPY | Facility: HOSPITAL | Age: 80
End: 2025-08-14

## 2025-08-14 VITALS
RESPIRATION RATE: 16 BRPM | OXYGEN SATURATION: 100 % | HEIGHT: 60 IN | WEIGHT: 140 LBS | HEART RATE: 62 BPM | DIASTOLIC BLOOD PRESSURE: 53 MMHG | SYSTOLIC BLOOD PRESSURE: 127 MMHG | BODY MASS INDEX: 27.48 KG/M2 | TEMPERATURE: 98 F

## 2025-08-14 RX ORDER — SODIUM CHLORIDE, SODIUM LACTATE, POTASSIUM CHLORIDE, CALCIUM CHLORIDE 600; 310; 30; 20 MG/100ML; MG/100ML; MG/100ML; MG/100ML
INJECTION, SOLUTION INTRAVENOUS CONTINUOUS
Status: DISCONTINUED | OUTPATIENT
Start: 2025-08-14 | End: 2025-08-14

## 2025-08-14 RX ORDER — LIDOCAINE HYDROCHLORIDE 10 MG/ML
INJECTION, SOLUTION EPIDURAL; INFILTRATION; INTRACAUDAL; PERINEURAL AS NEEDED
Status: DISCONTINUED | OUTPATIENT
Start: 2025-08-14 | End: 2025-08-14 | Stop reason: SURG

## 2025-08-14 RX ORDER — SODIUM CHLORIDE, SODIUM LACTATE, POTASSIUM CHLORIDE, CALCIUM CHLORIDE 600; 310; 30; 20 MG/100ML; MG/100ML; MG/100ML; MG/100ML
INJECTION, SOLUTION INTRAVENOUS CONTINUOUS PRN
Status: DISCONTINUED | OUTPATIENT
Start: 2025-08-14 | End: 2025-08-14 | Stop reason: SURG

## 2025-08-14 RX ADMIN — SODIUM CHLORIDE, SODIUM LACTATE, POTASSIUM CHLORIDE, CALCIUM CHLORIDE: 600; 310; 30; 20 INJECTION, SOLUTION INTRAVENOUS at 12:58:00

## 2025-08-14 RX ADMIN — LIDOCAINE HYDROCHLORIDE 25 MG: 10 INJECTION, SOLUTION EPIDURAL; INFILTRATION; INTRACAUDAL; PERINEURAL at 12:59:00

## (undated) DEVICE — ELECTRODE EKG W3.5XL4CM ABRAD W1.25XL1.5IN

## (undated) DEVICE — KIT VLV 5 PC AIR H2O SUCT BX ENDOGATOR CONN

## (undated) DEVICE — KIT CUSTOM ENDOPROCEDURE STERIS

## (undated) DEVICE — CANISTER SUCT 1200CC LID BLU HRD ADPT AUTO

## (undated) DEVICE — CANNULA NSL AD W/ FLTR 14FT O2/CO2

## (undated) DEVICE — KIT MFLD FOR SPEC COLL